# Patient Record
Sex: MALE | Race: WHITE | NOT HISPANIC OR LATINO | URBAN - METROPOLITAN AREA
[De-identification: names, ages, dates, MRNs, and addresses within clinical notes are randomized per-mention and may not be internally consistent; named-entity substitution may affect disease eponyms.]

---

## 2021-07-26 ENCOUNTER — EMERGENCY (EMERGENCY)
Facility: HOSPITAL | Age: 31
LOS: 1 days | Discharge: HOME | End: 2021-07-26
Attending: EMERGENCY MEDICINE | Admitting: EMERGENCY MEDICINE
Payer: COMMERCIAL

## 2021-07-26 VITALS
HEIGHT: 71 IN | WEIGHT: 134.92 LBS | RESPIRATION RATE: 16 BRPM | DIASTOLIC BLOOD PRESSURE: 78 MMHG | OXYGEN SATURATION: 98 % | HEART RATE: 76 BPM | SYSTOLIC BLOOD PRESSURE: 124 MMHG | TEMPERATURE: 98 F

## 2021-07-26 VITALS
SYSTOLIC BLOOD PRESSURE: 113 MMHG | OXYGEN SATURATION: 98 % | TEMPERATURE: 98 F | DIASTOLIC BLOOD PRESSURE: 77 MMHG | RESPIRATION RATE: 18 BRPM | HEART RATE: 55 BPM

## 2021-07-26 DIAGNOSIS — S61.321A LACERATION WITH FOREIGN BODY OF LEFT INDEX FINGER WITH DAMAGE TO NAIL, INITIAL ENCOUNTER: ICD-10-CM
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
## 2021-07-26 DIAGNOSIS — S68.111A COMPLETE TRAUMATIC METACARPOPHALANGEAL AMPUTATION OF LEFT INDEX FINGER, INITIAL ENCOUNTER: ICD-10-CM

## 2021-07-26 PROCEDURE — 99284 EMERGENCY DEPT VISIT MOD MDM: CPT

## 2021-07-26 PROCEDURE — 73130 X-RAY EXAM OF HAND: CPT | Mod: 26,LT

## 2021-07-26 RX ORDER — CEFAZOLIN SODIUM 1 G
2000 VIAL (EA) INJECTION ONCE
Refills: 0 | Status: COMPLETED | OUTPATIENT
Start: 2021-07-26 | End: 2021-07-26

## 2021-07-26 RX ORDER — TETANUS TOXOID, REDUCED DIPHTHERIA TOXOID AND ACELLULAR PERTUSSIS VACCINE, ADSORBED 5; 2.5; 8; 8; 2.5 [IU]/.5ML; [IU]/.5ML; UG/.5ML; UG/.5ML; UG/.5ML
0.5 SUSPENSION INTRAMUSCULAR ONCE
Refills: 0 | Status: COMPLETED | OUTPATIENT
Start: 2021-07-26 | End: 2021-07-26

## 2021-07-26 RX ORDER — IBUPROFEN 200 MG
600 TABLET ORAL ONCE
Refills: 0 | Status: COMPLETED | OUTPATIENT
Start: 2021-07-26 | End: 2021-07-26

## 2021-07-26 RX ADMIN — Medication 2000 MILLIGRAM(S): at 23:06

## 2021-07-26 RX ADMIN — Medication 100 MILLIGRAM(S): at 22:41

## 2021-07-26 RX ADMIN — TETANUS TOXOID, REDUCED DIPHTHERIA TOXOID AND ACELLULAR PERTUSSIS VACCINE, ADSORBED 0.5 MILLILITER(S): 5; 2.5; 8; 8; 2.5 SUSPENSION INTRAMUSCULAR at 22:41

## 2021-07-26 NOTE — ED PROVIDER NOTE - OBJECTIVE STATEMENT
31 yo M, presents with L second index finger distal digit laceration sustained pta with a kitchen cutting knife. denies other injury. R hand dominant. denies numbness, paresthesias or weakness to hand. denies prior injury. tetanus current

## 2021-07-26 NOTE — ED PROVIDER NOTE - PATIENT PORTAL LINK FT
You can access the FollowMyHealth Patient Portal offered by [facility] by registering at the following website: [email or web address]. By joining [facility]’s FollowMyHealth portal, you will also be able to view your health information using other applications (apps) compatible with our system.

## 2021-07-26 NOTE — CONSULT NOTE ADULT - ASSESSMENT
amputation of radial side of left index finger tip including nail plate, nail bed, and pulp skin  Suitable for composite grafting.

## 2021-07-26 NOTE — ED PROVIDER NOTE - SKIN LATERALITY #1
distal digit laceration, through 50% of nail bed with flap. discoloration whitish to flap, +2 radial pulse./left 2nd

## 2021-07-26 NOTE — ED ADULT NURSE NOTE - OBJECTIVE STATEMENT
Pt c/o laceration to left hand 2nd digit. Deep laceration noted to nail of digit, approximately 2cm in length. Bleeding controlled w/ pressure dressing.

## 2021-07-26 NOTE — ED PROVIDER NOTE - CARE PROVIDER_API CALL
Ricky Rutledge)  Plastic Surgery; Surgery of the Hand  [address] [place] [address], [address], [place] [address]  Phone: [phone]  Fax: [phone]  Follow Up Time:

## 2021-07-26 NOTE — CONSULT NOTE ADULT - PROBLEM SELECTOR RECOMMENDATION 9
skin defatted and nail plate, nail bed and pulp skin composite graft performed.  Tetanus  Ancef 2 grams IV antibiotics as patient declined oral antibiotics  follow up in my office in a week for first dressing change  keep dressing clean dry and elevated.  Cast bag for showers

## 2021-07-26 NOTE — CONSULT NOTE ADULT - SUBJECTIVE AND OBJECTIVE BOX
Cutting vegetables  at home suffered a knife laceration amputation the radial tip of the left index finger including nail plate, nail bed, and pulp  good bleeding bed, suitable for grafting.

## 2022-11-09 NOTE — ED ADULT NURSE NOTE - CCCP TRG CHIEF CMPLNT
Patient is a 75y old  Male who is coming from [facility], with medical history of ESRD (T--S), and DM coming to ED c/o R foot pain. Patient has severe peripheral arterial disease. He was admitted on 2022 for gangrene of right foot. At that time, patient was treated with IV abx. MRI was negative for OM. He had R LE angiogram on 10/3 w/ no targets for re vascularization but showed extensive distal small vessel disease, there was no acute vascular interventions done. Patient was recommended a R BKA but wanted a medical management instead. Patient now coming in c/o R foot pain. He states that he wants surgery now for his foot. He denies any fevers, chills.    in the ed VS: T 98, HR 88, /60, RR 18, Spo2 96%RA  Hb 8.1   s/p 1 dose vanc and zosyn in Ed       REVIEW OF SYSTEMS: Total of twelve systems have been reviewed with patient and found to be negative unless mentioned in HPI        PAST MEDICAL & SURGICAL HISTORY:  ESRD on dialysis  DM (diabetes mellitus)  PAD (peripheral artery disease)  No significant past surgical history        SOCIAL HISTORY  Alcohol: Does not drink  Tobacco: Does not smoke  Illicit substance use: None      FAMILY HISTORY: Non contributory to the present illness      ALLERGIES: No Known Allergies      Vital Signs Last 24 Hrs  T(C): 36.1 (2022 05:38), Max: 37.2 (2022 20:50)  T(F): 97 (2022 05:38), Max: 98.9 (2022 20:50)  HR: 78 (2022 05:38) (73 - 88)  BP: 135/60 (2022 05:38) (102/60 - 145/76)  BP(mean): --  RR: 17 (2022 05:38) (17 - 19)  SpO2: 100% (2022 05:38) (96% - 100%)    Parameters below as of 2022 05:38  Patient On (Oxygen Delivery Method): room air      PHYSICAL EXAM:  GENERAL: Not in distress   CHEST/LUNG:  Aire ntry bilaterally  HEART: s1 and s2 present  ABDOMEN:  Nontender and  Nondistended  EXTREMITIES: No pedal  edema  CNS: Awake and Alert      LABS:                        7.2    6.27  )-----------( 205      ( 2022 06:40 )             22.4           135  |  102  |  44<H>  ----------------------------<  178<H>  3.9   |  24  |  3.66<H>    Ca    8.6      2022 06:40  Phos  3.9       Mg     2.0         TPro  6.4  /  Alb  2.8<L>  /  TBili  0.3  /  DBili  x   /  AST  15  /  ALT  20  /  AlkPhos  57      PT/INR - ( 2022 06:40 )   PT: 13.6 sec;   INR: 1.14 ratio    PTT - ( 2022 06:40 )  PTT:37.0 sec        CAPILLARY BLOOD GLUCOSE  POCT Blood Glucose.: 153 mg/dL (2022 09:54)        Urinalysis Basic - ( 2022 18:00 )Color: Yellow / Appearance: Clear / S.005 / pH: x  Gluc: x / Ketone: Negative  / Bili: Negative / Urobili: Negative   Blood: x / Protein: 30 mg/dL / Nitrite: Negative   Leuk Esterase: Negative / RBC: 0-2 /HPF / WBC 0-2 /HPF   Sq Epi: x / Non Sq Epi: Occasional /HPF / Bacteria: Few /HPF        MEDICATIONS  (STANDING):  influenza  Vaccine (HIGH DOSE) 0.7 milliLiter(s) IntraMuscular once    MEDICATIONS  (PRN):  fentaNYL    Injectable 25 MICROGram(s) IV Push every 5 minutes PRN Moderate Pain (4 - 6)          RADIOLOGY & ADDITIONAL TESTS:               Patient is a 75y old  Male who is coming from [facility], with medical history of ESRD (T-TH-S), and DM, presents to the ER  for evaluation of Right foot pain. Patient has severe peripheral arterial disease. He was admitted on 2022 for gangrene of right foot. At that time, patient was treated with IV abx. MRI was negative for OM. He had Right LE angiogram on 10/3 w/ no targets for re vascularization but showed extensive distal small vessel disease, there was no acute vascular interventions done. Patient was recommended a R BKA but wanted a medical management instead. Patient now coming in c/o R foot pain. He states that he wants surgery now for his foot. On admission, he has no fever , no leukocytosis. He has evaluated by Vascular team and taken to OR for Right BKA. He has started on Vancomycin and Zosyn, and the ID consult requested to assist with further evaluation and antibiotic management.        REVIEW OF SYSTEMS: Total of twelve systems have been reviewed  and found to be negative unless mentioned in HPI        PAST MEDICAL & SURGICAL HISTORY:  ESRD on dialysis  DM (diabetes mellitus)  PAD (peripheral artery disease)  No significant past surgical history        SOCIAL HISTORY  Alcohol: Does not drink  Tobacco: Does not smoke  Illicit substance use: None      FAMILY HISTORY: Non contributory to the present illness      ALLERGIES: No Known Allergies      Vital Signs Last 24 Hrs  T(C): 36.1 (2022 05:38), Max: 37.2 (2022 20:50)  T(F): 97 (2022 05:38), Max: 98.9 (2022 20:50)  HR: 78 (2022 05:38) (73 - 88)  BP: 135/60 (2022 05:38) (102/60 - 145/76)  BP(mean): --  RR: 17 (2022 05:38) (17 - 19)  SpO2: 100% (2022 05:38) (96% - 100%)    Parameters below as of 2022 05:38  Patient On (Oxygen Delivery Method): room air      PHYSICAL EXAM:  in OR      LABS:                        7.2    6.27  )-----------( 205      ( 2022 06:40 )             22.4       -    135  |  102  |  44<H>  ----------------------------<  178<H>  3.9   |  24  |  3.66<H>    Ca    8.6      2022 06:40  Phos  3.9       Mg     2.0         TPro  6.4  /  Alb  2.8<L>  /  TBili  0.3  /  DBili  x   /  AST  15  /  ALT  20  /  AlkPhos  57      PT/INR - ( 2022 06:40 )   PT: 13.6 sec;   INR: 1.14 ratio    PTT - ( 2022 06:40 )  PTT:37.0 sec        CAPILLARY BLOOD GLUCOSE  POCT Blood Glucose.: 153 mg/dL (2022 09:54)        Urinalysis Basic - ( 2022 18:00 )Color: Yellow / Appearance: Clear / S.005 / pH: x  Gluc: x / Ketone: Negative  / Bili: Negative / Urobili: Negative   Blood: x / Protein: 30 mg/dL / Nitrite: Negative   Leuk Esterase: Negative / RBC: 0-2 /HPF / WBC 0-2 /HPF   Sq Epi: x / Non Sq Epi: Occasional /HPF / Bacteria: Few /HPF        MEDICATIONS  (STANDING):  influenza  Vaccine (HIGH DOSE) 0.7 milliLiter(s) IntraMuscular once    MEDICATIONS  (PRN):  fentaNYL    Injectable 25 MICROGram(s) IV Push every 5 minutes PRN Moderate Pain (4 - 6)        RADIOLOGY & ADDITIONAL TESTS:    < from: Xray Foot AP + Lateral + Oblique, Right (22 @ 19:08) >  1.  Swelling with soft tissue ulcer posterior to calcaneus.  2.  No plain film evidence of osteomyelitis.  3.  Obtain MRI as warranted clinically.      MICROBIOLOGY DATA:    Culture - Urine (22 @ 18:00)   Specimen Source: Clean Catch Clean Catch (Midstream)   Culture Results:   <10,000 CFU/mL Normal Urogenital Martha     COVID-19 PCR (22 @ 15:10)   COVID-19 PCR: NotDetec:             lacerations

## 2023-06-21 NOTE — ED ADULT NURSE NOTE - NSIMPLEMENTINTERV_GEN_ALL_ED
English
Implemented All Universal Safety Interventions:  [place] to call system. Call bell, personal items and telephone within reach. Instruct patient to call for assistance. Room bathroom lighting operational. Non-slip footwear when patient is off stretcher. Physically safe environment: no spills, clutter or unnecessary equipment. Stretcher in lowest position, wheels locked, appropriate side rails in place.